# Patient Record
Sex: FEMALE | Race: ASIAN
[De-identification: names, ages, dates, MRNs, and addresses within clinical notes are randomized per-mention and may not be internally consistent; named-entity substitution may affect disease eponyms.]

---

## 2019-11-25 ENCOUNTER — HOSPITAL ENCOUNTER (EMERGENCY)
Dept: HOSPITAL 25 - ED | Age: 54
Discharge: HOME | End: 2019-11-25
Payer: COMMERCIAL

## 2019-11-25 DIAGNOSIS — R07.81: ICD-10-CM

## 2019-11-25 DIAGNOSIS — R10.9: ICD-10-CM

## 2019-11-25 DIAGNOSIS — M54.89: Primary | ICD-10-CM

## 2019-11-25 LAB
ALBUMIN SERPL BCG-MCNC: 4.3 G/DL (ref 3.2–5.2)
ALBUMIN/GLOB SERPL: 1.7 {RATIO} (ref 1–3)
ALP SERPL-CCNC: 99 U/L (ref 34–104)
ALT SERPL W P-5'-P-CCNC: 22 U/L (ref 7–52)
ANION GAP SERPL CALC-SCNC: 4 MMOL/L (ref 2–11)
AST SERPL-CCNC: 24 U/L (ref 13–39)
BASOPHILS # BLD AUTO: 0.1 10^3/UL (ref 0–0.2)
BUN SERPL-MCNC: 18 MG/DL (ref 6–24)
BUN/CREAT SERPL: 23.1 (ref 8–20)
CALCIUM SERPL-MCNC: 9.2 MG/DL (ref 8.6–10.3)
CHLORIDE SERPL-SCNC: 106 MMOL/L (ref 101–111)
EOSINOPHIL # BLD AUTO: 0.1 10^3/UL (ref 0–0.6)
GLOBULIN SER CALC-MCNC: 2.5 G/DL (ref 2–4)
GLUCOSE SERPL-MCNC: 109 MG/DL (ref 70–100)
HCO3 SERPL-SCNC: 30 MMOL/L (ref 22–32)
HCT VFR BLD AUTO: 35 % (ref 35–47)
HGB BLD-MCNC: 11.3 G/DL (ref 12–16)
LYMPHOCYTES # BLD AUTO: 1.5 10^3/UL (ref 1–4.8)
MCH RBC QN AUTO: 19 PG (ref 27–31)
MCHC RBC AUTO-ENTMCNC: 32 G/DL (ref 31–36)
MCV RBC AUTO: 61 FL (ref 80–97)
MICROCYTES BLD QL SMEAR: (no result)
MONOCYTES # BLD AUTO: 0.3 10^3/UL (ref 0–0.8)
NEUTROPHILS # BLD AUTO: 2.9 10^3/UL (ref 1.5–7.7)
NRBC # BLD AUTO: 0 10^3/UL
NRBC BLD QL AUTO: 0.1
PLATELET # BLD AUTO: 232 10^3/UL (ref 150–450)
POTASSIUM SERPL-SCNC: 4 MMOL/L (ref 3.5–5)
PROT SERPL-MCNC: 6.8 G/DL (ref 6.4–8.9)
RBC # BLD AUTO: 5.85 10^6 /UL (ref 3.7–4.87)
SODIUM SERPL-SCNC: 140 MMOL/L (ref 135–145)
TROPONIN I SERPL-MCNC: 0.01 NG/ML (ref ?–0.03)
WBC # BLD AUTO: 4.8 10^3/UL (ref 3.5–10.8)

## 2019-11-25 PROCEDURE — 83690 ASSAY OF LIPASE: CPT

## 2019-11-25 PROCEDURE — 85060 BLOOD SMEAR INTERPRETATION: CPT

## 2019-11-25 PROCEDURE — 99282 EMERGENCY DEPT VISIT SF MDM: CPT

## 2019-11-25 PROCEDURE — 96374 THER/PROPH/DIAG INJ IV PUSH: CPT

## 2019-11-25 PROCEDURE — 93005 ELECTROCARDIOGRAM TRACING: CPT

## 2019-11-25 PROCEDURE — 36415 COLL VENOUS BLD VENIPUNCTURE: CPT

## 2019-11-25 PROCEDURE — 85025 COMPLETE CBC W/AUTO DIFF WBC: CPT

## 2019-11-25 PROCEDURE — 86140 C-REACTIVE PROTEIN: CPT

## 2019-11-25 PROCEDURE — 71275 CT ANGIOGRAPHY CHEST: CPT

## 2019-11-25 PROCEDURE — 83605 ASSAY OF LACTIC ACID: CPT

## 2019-11-25 PROCEDURE — 81003 URINALYSIS AUTO W/O SCOPE: CPT

## 2019-11-25 PROCEDURE — 80053 COMPREHEN METABOLIC PANEL: CPT

## 2019-11-25 PROCEDURE — 84484 ASSAY OF TROPONIN QUANT: CPT

## 2019-11-25 NOTE — ED
Progress





- Progress Note


Progress Note: 





Pt is a signout from Dr. Rosa at 1900 on 11/25/19 pending a 2nd troponin. 





Re-Evaluation





- Re-Evaluation


  ** First Eval


Re-Evaluation Time: 18:54





Course/Dx





- Course


Course Of Treatment: Pt is a signout from Dr. Rosa at 1900 on 11/25/19 

pending a 2nd troponin. 2nd troponin is 0.01, pt can be d/c'ed.





- Diagnoses


Provider Diagnoses: 


 Back pain








Discharge ED





- Sign-Out/Discharge


Documenting (check all that apply): Patient Departure, Receiving Sign-Out


Receiving patient FROM: Aung Rosa





- Discharge Plan


Condition: Stable


Disposition: HOME


Patient Education Materials:  Back Pain (ED)


Referrals: 


[facility] [Outside] - If Needed





- Billing Disposition and Condition


Condition: STABLE


Disposition: Home





- Attestation Statements


Document Initiated by Scribe: Yes


Documenting Scribe: Loly Sal


Provider For Whom Carlee is Documenting (Include Credential): Moy Salomon MD.


Scribe Attestation: 


Loly GAONA scribed for Moy Salomon MD. on 11/26/19 at 0523. 


Scribe Documentation Reviewed: Yes


Provider Attestation: 


The documentation as recorded by the Loly lombardi accurately 

reflects the service I personally performed and the decisions made by Moy bennett MD.


Status of Scribe Document: Viewed

## 2019-11-25 NOTE — ED
Back Pain





- HPI Summary


HPI Summary: 


This patient is a 54 year old female presenting to [facility] with a chief complaint 

of left-sided back and flank pain since 10 hours ago. The patient woke up in 

the middle of the night with the pain. She states the pain was reduced in 

severity when she woke up this morning. She states it may have been due to 

injury that had to do with her twisting her arm. She describes the pain as a 

sharp pain that says made her unable to move secondary to the pain. She states 

she felt SOB upon exertion when she was walking up the hills at [place]. She is 

visiting from [place]. She denies numbness. Pt denies any fever, chills, 

erythema of eyes, sore throat, CP, cough, abdominal pain, N/V, dysuria, 

hematuria, myalgia, edema, rash, or dizziness.  





- History of Current Complaint


Chief Complaint: EDBackInjuryPain


Stated Complaint: BACK PAIN


Time Seen by Provider: 11/25/19 13:54


Hx Obtained From: Patient


Onset/Duration: Lasting Hours


Onset/Duration: Started Hours Ago


Pain Intensity: 3


Pain Scale Used: 0-10 Numeric


Character: Sharp





- Allergies/Home Medications


Allergies/Adverse Reactions: 


 Allergies











Allergy/AdvReac Type Severity Reaction Status Date / Time


 


No Known Allergies Allergy   Verified 11/25/19 13:30














PMH/Surg Hx/FS Hx/Imm Hx


Cardiovascular History: 


   Denies: Hx Coronary Artery Disease


Respiratory History: 


   Denies: Hx Asthma


Infectious Disease History: No


Infectious Disease History: 


   Denies: Traveled Outside the [place] in Last 30 Days





- Family History


Known Family History: Positive: Hypertension, Diabetes





- Social History


Alcohol Use: None


Hx Tobacco Use: No





Review of Systems


Negative: Chills


Negative: Drainage


Negative: Sore Throat


Negative: Chest Pain


Positive: Shortness Of Breath.  Negative: Cough


Negative: Abdominal Pain, Vomiting, Nausea


Positive: flank pain.  Negative: dysuria, hematuria


Positive: Other - Back pain.  Negative: Myalgia, Edema


Negative: Rash


Neurological: Other - Neg: Dizziness


Negative: Numbness


All Other Systems Reviewed And Are Negative: No





Physical Exam





- Summary


Physical Exam Summary: 





Constitutional: Well-developed, Well-nourished, Alert. (-) Distressed


Skin: Warm, Dry


HENT: Normocephalic; Atraumatic


Eyes: Conjunctiva normal


Neck: Musculoskeletal ROM normal neck. (-) JVD, (-) Stridor, (-) Tracheal 

deviation


Cardio: Rhythm regular, rate normal, Heart sounds normal; Intact distal pulses; 

The pedal pulses are 2+ and symmetric. Radial pulses are 2+ and symmetric. (-) 

Murmur


Pulmonary/Chest wall: Effort normal. (-) Respiratory distress, (-) Wheezes, (-) 

Rales


Abd: Soft, (-) tenderness, (-) Distension, (-) Guarding, (-) Rebound


Musculoskeletal: (-) Edema


Lymph: (-) Cervical adenopathy


Neuro: Alert, Oriented x3


Psych: Mood and affect Normal





Triage Information Reviewed: Yes


Vital Signs On Initial Exam: 


 Initial Vitals











Temp Pulse Resp BP Pulse Ox


 


 98.8 F   70   16   120/71   98 


 


 11/25/19 13:27  11/25/19 13:27  11/25/19 13:27  11/25/19 13:27  11/25/19 13:27











Vital Signs Reviewed: Yes





Procedures





- Sedation


Patient Received Moderate/Deep Sedation with Procedure: No





Diagnostics





- Vital Signs


 Vital Signs











  Temp Pulse Resp BP Pulse Ox


 


 11/25/19 13:27  98.8 F  70  16  120/71  98














- Laboratory


Result Diagrams: 


 11/25/19 14:14





 11/25/19 14:14


Lab Statement: Any lab studies that have been ordered have been reviewed, and 

results considered in the medical decision making process.





- CT


  ** Chest/Thorax CTA


CT Interpretation Completed By: Radiologist


Summary of CT Findings: No evidence of pulmonary embolus is noted. No evidence 

of aortic dissection is noted. No pulmonary nodules or alveolar consolidation 

is noted. ED Provider has reviewed this report.





- EKG


  ** 1408


Cardiac Rate: NL - 64 BPM


EKG Rhythm: Sinus Rhythm


Summary of EKG Findings: No STEMI. ED Physician has reviewed and intepreted 

this report.





Re-Evaluation





- Re-Evaluation


  ** First Eval


Re-Evaluation Time: 18:54


Change: Improved


Comment: Feels better after Toradol.





Back Pain Course/Dx





- Course


Course Of Treatment: This patient is a 54 year old female presenting to [facility] 

with a chief complaint of left-sided back and flank pain since 10 hours 

ago.Labs were unremarkable except RBC 5.85 H, Hgb 11.3 L, MCV 61 L, MCH 19 L, 

RDW 17 H, BUN/Creatinine Ratio 23.1 H, Glucose 109 H. CXR was unremarkable.


Assessment/Plan: There are no EKG findings of pericarditis.  There is no 

pulmonary embolism seen on CTA.  2 troponins are pending at the time of sign out

, given the reproducibility with movement, acute coronary syndrome very 

unlikely.  Plan for outpatient follow-up with a primary care physician as 

needed.  The patient responded completely to anti-inflammatories.  Likely 

musculoskeletal.





Discharge ED





- Discharge Plan


Referrals: 


No Primary Care Phys,NOPCP [Primary Care Provider] - 





- Attestation Statements


Document Initiated by Carlee: Yes


Documenting Scribe: Guy Arreaga


Provider For Whom Carlee is Documenting (Include Credential): Aung Rosa MD


Scribe Attestation: 


I, Guy Arreaga, scribed for Aung Rosa MD on 11/25/19 at 7530. 


Status of Scribe Document: Viewed